# Patient Record
Sex: MALE | Race: WHITE | Employment: FULL TIME | URBAN - METROPOLITAN AREA
[De-identification: names, ages, dates, MRNs, and addresses within clinical notes are randomized per-mention and may not be internally consistent; named-entity substitution may affect disease eponyms.]

---

## 2018-10-30 ENCOUNTER — HOSPITAL ENCOUNTER (OUTPATIENT)
Dept: PHYSICAL THERAPY | Age: 28
Setting detail: THERAPIES SERIES
Discharge: HOME | End: 2018-10-30
Payer: COMMERCIAL

## 2018-10-30 PROCEDURE — 97110 THERAPEUTIC EXERCISES: CPT

## 2018-10-30 PROCEDURE — 97161 PT EVAL LOW COMPLEX 20 MIN: CPT

## 2018-10-30 ASSESSMENT — PAIN DESCRIPTION - LOCATION
LOCATION_2: KNEE
LOCATION: KNEE

## 2018-10-30 ASSESSMENT — PAIN DESCRIPTION - DURATION: DURATION_2: INTERMITTENT

## 2018-10-30 ASSESSMENT — PAIN DESCRIPTION - PAIN TYPE
TYPE_2: CHRONIC PAIN
TYPE: CHRONIC PAIN

## 2018-10-30 ASSESSMENT — PAIN SCALES - GENERAL: PAINLEVEL_OUTOF10: 0

## 2018-10-30 ASSESSMENT — PAIN DESCRIPTION - DESCRIPTORS
DESCRIPTORS: ACHING;SHOOTING;THROBBING
DESCRIPTORS_2: ACHING;SHOOTING;THROBBING

## 2018-10-30 ASSESSMENT — PAIN DESCRIPTION - INTENSITY: RATING_2: 0

## 2018-10-30 ASSESSMENT — PAIN DESCRIPTION - ORIENTATION: ORIENTATION: LEFT;INNER;POSTERIOR

## 2018-10-30 ASSESSMENT — PAIN DESCRIPTION - PROGRESSION: CLINICAL_PROGRESSION: GRADUALLY WORSENING

## 2018-10-30 ASSESSMENT — PAIN DESCRIPTION - ONSET
ONSET_2: GRADUAL
ONSET: GRADUAL

## 2018-10-30 ASSESSMENT — PAIN DESCRIPTION - FREQUENCY: FREQUENCY: INTERMITTENT

## 2018-11-01 ENCOUNTER — APPOINTMENT (OUTPATIENT)
Dept: PHYSICAL THERAPY | Age: 28
End: 2018-11-01
Payer: COMMERCIAL

## 2018-11-01 ENCOUNTER — HOSPITAL ENCOUNTER (OUTPATIENT)
Dept: PHYSICAL THERAPY | Age: 28
Setting detail: THERAPIES SERIES
Discharge: HOME | End: 2018-11-01
Payer: COMMERCIAL

## 2018-11-01 PROCEDURE — 97110 THERAPEUTIC EXERCISES: CPT

## 2018-11-01 ASSESSMENT — PAIN DESCRIPTION - LOCATION
LOCATION: KNEE
LOCATION_2: KNEE

## 2018-11-01 ASSESSMENT — PAIN DESCRIPTION - INTENSITY: RATING_2: 1

## 2018-11-01 ASSESSMENT — PAIN DESCRIPTION - PAIN TYPE
TYPE_2: CHRONIC PAIN
TYPE: CHRONIC PAIN

## 2018-11-01 ASSESSMENT — PAIN DESCRIPTION - ORIENTATION: ORIENTATION: LEFT;INNER;POSTERIOR

## 2018-11-01 ASSESSMENT — PAIN SCALES - GENERAL: PAINLEVEL_OUTOF10: 2

## 2018-11-01 NOTE — PROGRESS NOTES
Physical Therapy  Progress Note  Date: 2018  Patient Name: Víctor Wilkins  MRN: [id number]  : 1990     Treatment Diagnosis: bilateral knee pain M25.569    Restrictions       Subjective   General  Additional Pertinent Hx: heart murmur at age 26  Referring Practitioner: Shilo Graf MD  Referral Date : 10/30/18  Diagnosis: bilateral knee pain M25.569  Follows Commands: Within Functional Limits  PT Visit Information  Onset Date: 10/30/14  PT Insurance Information: [employer]  Total # of Visits to Date: 2  Subjective  Subjective: Patient noting complaints of (B) medial joint line pain and (L) knee instability. Initial injuries with [employer] athletics, intermittent issues since that time. 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 Pain Screening  Patient Currently in Pain: Yes  Pain Assessment  Pain Assessment: 0-10  Pain Level: 2  Pain Type: Chronic pain  Pain Location: Knee  Pain Orientation: Left; Inner;Posterior  Pain 2  Pain Rating 2: 1  Pain Type 2: Chronic Pain  Pain Location 2: Knee  Pain Orientation 2: Inner;Posterior;Right  Vital Signs  Patient Currently in Pain: Yes    Objective  AROM RLE (degrees)  RLE General AROM: 0-135  AROM LLE (degrees)  LLE General AROM: 0-135    Exercises  Exercise 2: supine ball squeeze with bridge 10x 5\" hold  Exercise 3: Airdyne 8'  Exercise 4: heel slide 15x  Exercise 5: 3 way hip (B) 20x  Exercise 6: TKE (B) 20x  Exercise 7: sidelying abduction blue 20x  Exercise 8: clamshells 20x blue     Assessment   Conditions Requiring Skilled Therapeutic Intervention  Body structures, Functions, Activity limitations: Decreased ADL status; Decreased strength  Assessment: Patient presents as possible (B) medial joint line issue, contributing weakness of the (B) hip abductor/exteral rotator

## 2018-11-05 ENCOUNTER — APPOINTMENT (OUTPATIENT)
Dept: PHYSICAL THERAPY | Age: 28
End: 2018-11-05
Payer: COMMERCIAL

## 2018-11-08 ENCOUNTER — APPOINTMENT (OUTPATIENT)
Dept: PHYSICAL THERAPY | Age: 28
End: 2018-11-08
Payer: COMMERCIAL

## 2018-12-06 ENCOUNTER — HOSPITAL ENCOUNTER (OUTPATIENT)
Dept: PHYSICAL THERAPY | Age: 28
Setting detail: THERAPIES SERIES
Discharge: HOME | End: 2018-12-06
Payer: COMMERCIAL

## 2018-12-06 PROCEDURE — 97110 THERAPEUTIC EXERCISES: CPT

## 2018-12-07 ASSESSMENT — PAIN DESCRIPTION - LOCATION
LOCATION_2: KNEE
LOCATION: KNEE

## 2018-12-07 ASSESSMENT — PAIN DESCRIPTION - PAIN TYPE
TYPE: CHRONIC PAIN
TYPE_2: CHRONIC PAIN

## 2018-12-07 ASSESSMENT — PAIN DESCRIPTION - INTENSITY: RATING_2: 1

## 2018-12-07 ASSESSMENT — PAIN SCALES - GENERAL: PAINLEVEL_OUTOF10: 2

## 2018-12-07 ASSESSMENT — PAIN DESCRIPTION - ORIENTATION: ORIENTATION: LEFT;INNER;POSTERIOR
